# Patient Record
Sex: MALE | Race: OTHER | HISPANIC OR LATINO
[De-identification: names, ages, dates, MRNs, and addresses within clinical notes are randomized per-mention and may not be internally consistent; named-entity substitution may affect disease eponyms.]

---

## 2017-03-01 ENCOUNTER — APPOINTMENT (OUTPATIENT)
Dept: PULMONOLOGY | Facility: CLINIC | Age: 67
End: 2017-03-01

## 2017-03-08 ENCOUNTER — APPOINTMENT (OUTPATIENT)
Dept: PULMONOLOGY | Facility: CLINIC | Age: 67
End: 2017-03-08

## 2017-03-08 VITALS
OXYGEN SATURATION: 97 % | DIASTOLIC BLOOD PRESSURE: 70 MMHG | BODY MASS INDEX: 30.53 KG/M2 | WEIGHT: 190 LBS | TEMPERATURE: 98.5 F | HEIGHT: 66 IN | SYSTOLIC BLOOD PRESSURE: 120 MMHG | HEART RATE: 77 BPM

## 2017-08-29 ENCOUNTER — LABORATORY RESULT (OUTPATIENT)
Age: 67
End: 2017-08-29

## 2017-08-29 ENCOUNTER — APPOINTMENT (OUTPATIENT)
Dept: INTERNAL MEDICINE | Facility: CLINIC | Age: 67
End: 2017-08-29
Payer: COMMERCIAL

## 2017-08-29 VITALS
OXYGEN SATURATION: 98 % | HEART RATE: 74 BPM | SYSTOLIC BLOOD PRESSURE: 108 MMHG | HEIGHT: 66 IN | BODY MASS INDEX: 37.61 KG/M2 | DIASTOLIC BLOOD PRESSURE: 62 MMHG | WEIGHT: 234 LBS | TEMPERATURE: 98.8 F

## 2017-08-29 DIAGNOSIS — M66.0 RUPTURE OF POPLITEAL CYST: ICD-10-CM

## 2017-08-29 DIAGNOSIS — M1A.09X0 IDIOPATHIC CHRONIC GOUT, MULTIPLE SITES, W/OUT TOPHUS (TOPHI): ICD-10-CM

## 2017-08-29 DIAGNOSIS — E66.9 OBESITY, UNSPECIFIED: ICD-10-CM

## 2017-08-29 DIAGNOSIS — N20.0 CALCULUS OF KIDNEY: ICD-10-CM

## 2017-08-29 DIAGNOSIS — J98.11 ATELECTASIS: ICD-10-CM

## 2017-08-29 DIAGNOSIS — Z09 ENCOUNTER FOR FOLLOW-UP EXAMINATION AFTER COMPLETED TREATMENT FOR CONDITIONS OTHER THAN MALIGNANT NEOPLASM: ICD-10-CM

## 2017-08-29 DIAGNOSIS — Z86.2 PERSONAL HISTORY OF DISEASES OF THE BLOOD AND BLOOD-FORMING ORGANS AND CERTAIN DISORDERS INVOLVING THE IMMUNE MECHANISM: ICD-10-CM

## 2017-08-29 PROCEDURE — 99211 OFF/OP EST MAY X REQ PHY/QHP: CPT | Mod: 25

## 2017-08-29 PROCEDURE — 36415 COLL VENOUS BLD VENIPUNCTURE: CPT

## 2017-08-29 PROCEDURE — 99397 PER PM REEVAL EST PAT 65+ YR: CPT | Mod: 25

## 2017-08-29 RX ORDER — ASPIRIN 81 MG
81 TABLET,CHEWABLE ORAL
Refills: 0 | Status: ACTIVE | COMMUNITY
Start: 2017-08-29

## 2017-08-30 ENCOUNTER — MESSAGE (OUTPATIENT)
Age: 67
End: 2017-08-30

## 2017-08-30 LAB
25(OH)D3 SERPL-MCNC: 51.7 NG/ML
ALBUMIN SERPL ELPH-MCNC: 4.4 G/DL
ALP BLD-CCNC: 115 U/L
ALT SERPL-CCNC: 5 U/L
ANION GAP SERPL CALC-SCNC: 19 MMOL/L
APPEARANCE: ABNORMAL
AST SERPL-CCNC: 21 U/L
BACTERIA: NEGATIVE
BASOPHILS # BLD AUTO: 0.05 K/UL
BASOPHILS NFR BLD AUTO: 0.4 %
BILIRUB SERPL-MCNC: 0.5 MG/DL
BILIRUBIN URINE: NEGATIVE
BLOOD URINE: ABNORMAL
BUN SERPL-MCNC: 39 MG/DL
CALCIUM SERPL-MCNC: 10.8 MG/DL
CHLORIDE SERPL-SCNC: 101 MMOL/L
CHOLEST SERPL-MCNC: 109 MG/DL
CHOLEST/HDLC SERPL: 2.2 RATIO
CO2 SERPL-SCNC: 21 MMOL/L
COLOR: ABNORMAL
CREAT SERPL-MCNC: 1.79 MG/DL
CREAT SPEC-SCNC: 183 MG/DL
EOSINOPHIL # BLD AUTO: 0.31 K/UL
EOSINOPHIL NFR BLD AUTO: 2.3 %
GLUCOSE QUALITATIVE U: NORMAL MG/DL
GLUCOSE SERPL-MCNC: 83 MG/DL
HBA1C MFR BLD HPLC: 5.5 %
HCT VFR BLD CALC: 37.3 %
HCV AB SER QL: NONREACTIVE
HCV S/CO RATIO: 0.09 S/CO
HDLC SERPL-MCNC: 49 MG/DL
HGB BLD-MCNC: 10.7 G/DL
HYALINE CASTS: 2 /LPF
IMM GRANULOCYTES NFR BLD AUTO: 0.4 %
KETONES URINE: NEGATIVE
LDLC SERPL CALC-MCNC: 49 MG/DL
LEUKOCYTE ESTERASE URINE: ABNORMAL
LYMPHOCYTES # BLD AUTO: 1.74 K/UL
LYMPHOCYTES NFR BLD AUTO: 12.8 %
MAN DIFF?: NORMAL
MCHC RBC-ENTMCNC: 21.7 PG
MCHC RBC-ENTMCNC: 28.7 GM/DL
MCV RBC AUTO: 75.5 FL
MICROALBUMIN 24H UR DL<=1MG/L-MCNC: 3.3 MG/DL
MICROALBUMIN/CREAT 24H UR-RTO: 18 MG/G
MICROSCOPIC-UA: NORMAL
MONOCYTES # BLD AUTO: 0.53 K/UL
MONOCYTES NFR BLD AUTO: 3.9 %
NEUTROPHILS # BLD AUTO: 10.91 K/UL
NEUTROPHILS NFR BLD AUTO: 80.2 %
NITRITE URINE: NEGATIVE
PH URINE: 5
PLATELET # BLD AUTO: 417 K/UL
POTASSIUM SERPL-SCNC: 4.5 MMOL/L
PROT SERPL-MCNC: 8.2 G/DL
PROTEIN URINE: NEGATIVE MG/DL
RBC # BLD: 4.94 M/UL
RBC # FLD: 25.6 %
RED BLOOD CELLS URINE: 250 /HPF
SODIUM SERPL-SCNC: 141 MMOL/L
SPECIFIC GRAVITY URINE: 1.02
SQUAMOUS EPITHELIAL CELLS: 0 /HPF
TRIGL SERPL-MCNC: 53 MG/DL
TSH SERPL-ACNC: 6.03 UIU/ML
URINE COMMENTS: NORMAL
UROBILINOGEN URINE: 1 MG/DL
WBC # FLD AUTO: 13.59 K/UL
WHITE BLOOD CELLS URINE: 124 /HPF

## 2017-08-31 ENCOUNTER — MOBILE ON CALL (OUTPATIENT)
Age: 67
End: 2017-08-31

## 2017-09-07 ENCOUNTER — TRANSCRIPTION ENCOUNTER (OUTPATIENT)
Age: 67
End: 2017-09-07

## 2017-09-11 ENCOUNTER — MESSAGE (OUTPATIENT)
Age: 67
End: 2017-09-11

## 2017-09-13 ENCOUNTER — MESSAGE (OUTPATIENT)
Age: 67
End: 2017-09-13

## 2017-11-27 ENCOUNTER — TRANSCRIPTION ENCOUNTER (OUTPATIENT)
Age: 67
End: 2017-11-27

## 2017-11-27 ENCOUNTER — MEDICATION RENEWAL (OUTPATIENT)
Age: 67
End: 2017-11-27

## 2018-01-10 ENCOUNTER — APPOINTMENT (OUTPATIENT)
Dept: HEART AND VASCULAR | Facility: CLINIC | Age: 68
End: 2018-01-10

## 2018-02-12 ENCOUNTER — APPOINTMENT (OUTPATIENT)
Dept: PULMONOLOGY | Facility: CLINIC | Age: 68
End: 2018-02-12
Payer: COMMERCIAL

## 2018-02-12 VITALS
DIASTOLIC BLOOD PRESSURE: 80 MMHG | BODY MASS INDEX: 37.61 KG/M2 | WEIGHT: 234 LBS | HEART RATE: 66 BPM | TEMPERATURE: 99.2 F | HEIGHT: 66 IN | SYSTOLIC BLOOD PRESSURE: 140 MMHG | OXYGEN SATURATION: 96 %

## 2018-02-12 PROCEDURE — 99215 OFFICE O/P EST HI 40 MIN: CPT | Mod: 25

## 2018-02-12 PROCEDURE — 71046 X-RAY EXAM CHEST 2 VIEWS: CPT

## 2018-02-12 PROCEDURE — 94010 BREATHING CAPACITY TEST: CPT

## 2018-03-02 ENCOUNTER — APPOINTMENT (OUTPATIENT)
Dept: INTERNAL MEDICINE | Facility: CLINIC | Age: 68
End: 2018-03-02

## 2018-03-30 ENCOUNTER — APPOINTMENT (OUTPATIENT)
Dept: INTERNAL MEDICINE | Facility: CLINIC | Age: 68
End: 2018-03-30
Payer: COMMERCIAL

## 2018-03-30 ENCOUNTER — LABORATORY RESULT (OUTPATIENT)
Age: 68
End: 2018-03-30

## 2018-03-30 VITALS
DIASTOLIC BLOOD PRESSURE: 77 MMHG | HEART RATE: 68 BPM | SYSTOLIC BLOOD PRESSURE: 108 MMHG | OXYGEN SATURATION: 98 % | HEIGHT: 66 IN | TEMPERATURE: 98.3 F | WEIGHT: 260 LBS | BODY MASS INDEX: 41.78 KG/M2

## 2018-03-30 DIAGNOSIS — M48.061 SPINAL STENOSIS, LUMBAR REGION WITHOUT NEUROGENIC CLAUDICATION: ICD-10-CM

## 2018-03-30 DIAGNOSIS — J44.9 CHRONIC OBSTRUCTIVE PULMONARY DISEASE, UNSPECIFIED: ICD-10-CM

## 2018-03-30 PROCEDURE — 99214 OFFICE O/P EST MOD 30 MIN: CPT | Mod: 25

## 2018-03-30 PROCEDURE — 36415 COLL VENOUS BLD VENIPUNCTURE: CPT

## 2018-04-03 ENCOUNTER — TRANSCRIPTION ENCOUNTER (OUTPATIENT)
Age: 68
End: 2018-04-03

## 2018-04-03 LAB
25(OH)D3 SERPL-MCNC: 45 NG/ML
ALBUMIN SERPL ELPH-MCNC: 3.7 G/DL
ALP BLD-CCNC: 89 U/L
ALT SERPL-CCNC: 9 U/L
ANION GAP SERPL CALC-SCNC: 18 MMOL/L
AST SERPL-CCNC: 24 U/L
BASOPHILS # BLD AUTO: 0 K/UL
BASOPHILS NFR BLD AUTO: 0 %
BILIRUB SERPL-MCNC: 0.5 MG/DL
BUN SERPL-MCNC: 37 MG/DL
CALCIUM SERPL-MCNC: 10 MG/DL
CHLORIDE SERPL-SCNC: 107 MMOL/L
CHOLEST SERPL-MCNC: 92 MG/DL
CHOLEST/HDLC SERPL: 2.5 RATIO
CO2 SERPL-SCNC: 21 MMOL/L
CREAT SERPL-MCNC: 1.61 MG/DL
EOSINOPHIL # BLD AUTO: 0.56 K/UL
EOSINOPHIL NFR BLD AUTO: 4.5 %
FERRITIN SERPL-MCNC: 21 NG/ML
FOLATE SERPL-MCNC: >20 NG/ML
GLUCOSE SERPL-MCNC: 102 MG/DL
HBA1C MFR BLD HPLC: 5.8 %
HCT VFR BLD CALC: 33.8 %
HDLC SERPL-MCNC: 37 MG/DL
HGB BLD-MCNC: 9.5 G/DL
IRON SATN MFR SERPL: 6 %
IRON SERPL-MCNC: 18 UG/DL
LDLC SERPL CALC-MCNC: 43 MG/DL
LYMPHOCYTES # BLD AUTO: 0.34 K/UL
LYMPHOCYTES NFR BLD AUTO: 2.7 %
MAN DIFF?: NORMAL
MCHC RBC-ENTMCNC: 20 PG
MCHC RBC-ENTMCNC: 28.1 GM/DL
MCV RBC AUTO: 71 FL
MONOCYTES # BLD AUTO: 0.34 K/UL
MONOCYTES NFR BLD AUTO: 2.7 %
NEUTROPHILS # BLD AUTO: 11.2 K/UL
NEUTROPHILS NFR BLD AUTO: 90.1 %
PLATELET # BLD AUTO: 380 K/UL
POTASSIUM SERPL-SCNC: 4.6 MMOL/L
PROT SERPL-MCNC: 7.1 G/DL
RBC # BLD: 4.76 M/UL
RBC # FLD: 23 %
SODIUM SERPL-SCNC: 146 MMOL/L
T3FREE SERPL-MCNC: 2.63 PG/ML
T4 FREE SERPL-MCNC: 1.1 NG/DL
TIBC SERPL-MCNC: 324 UG/DL
TRIGL SERPL-MCNC: 62 MG/DL
TSH SERPL-ACNC: 6.3 UIU/ML
UIBC SERPL-MCNC: 306 UG/DL
VIT B12 SERPL-MCNC: 1333 PG/ML
WBC # FLD AUTO: 12.43 K/UL

## 2018-04-09 ENCOUNTER — RX RENEWAL (OUTPATIENT)
Age: 68
End: 2018-04-09

## 2018-04-09 ENCOUNTER — TRANSCRIPTION ENCOUNTER (OUTPATIENT)
Age: 68
End: 2018-04-09

## 2018-06-12 ENCOUNTER — RX RENEWAL (OUTPATIENT)
Age: 68
End: 2018-06-12

## 2018-06-25 ENCOUNTER — RX RENEWAL (OUTPATIENT)
Age: 68
End: 2018-06-25

## 2018-06-25 ENCOUNTER — TRANSCRIPTION ENCOUNTER (OUTPATIENT)
Age: 68
End: 2018-06-25

## 2018-07-18 ENCOUNTER — LABORATORY RESULT (OUTPATIENT)
Age: 68
End: 2018-07-18

## 2018-07-18 ENCOUNTER — APPOINTMENT (OUTPATIENT)
Dept: DERMATOLOGY | Facility: CLINIC | Age: 68
End: 2018-07-18
Payer: COMMERCIAL

## 2018-07-18 VITALS — SYSTOLIC BLOOD PRESSURE: 98 MMHG | DIASTOLIC BLOOD PRESSURE: 68 MMHG

## 2018-07-18 DIAGNOSIS — E61.1 IRON DEFICIENCY: ICD-10-CM

## 2018-07-18 DIAGNOSIS — L29.9 PRURITUS, UNSPECIFIED: ICD-10-CM

## 2018-07-18 PROCEDURE — 99203 OFFICE O/P NEW LOW 30 MIN: CPT

## 2018-07-18 PROCEDURE — 36415 COLL VENOUS BLD VENIPUNCTURE: CPT

## 2018-07-19 ENCOUNTER — MOBILE ON CALL (OUTPATIENT)
Age: 68
End: 2018-07-19

## 2018-07-23 LAB
ALBUMIN SERPL ELPH-MCNC: 4.3 G/DL
ALP BLD-CCNC: 86 U/L
ALT SERPL-CCNC: 7 U/L
ANION GAP SERPL CALC-SCNC: 17 MMOL/L
AST SERPL-CCNC: 19 U/L
BILIRUB SERPL-MCNC: 0.4 MG/DL
BUN SERPL-MCNC: 21 MG/DL
CALCIUM SERPL-MCNC: 10.1 MG/DL
CHLORIDE SERPL-SCNC: 102 MMOL/L
CO2 SERPL-SCNC: 24 MMOL/L
CREAT SERPL-MCNC: 1.39 MG/DL
GLUCOSE SERPL-MCNC: 144 MG/DL
POTASSIUM SERPL-SCNC: 4.5 MMOL/L
PROT SERPL-MCNC: 7.6 G/DL
SODIUM SERPL-SCNC: 143 MMOL/L
TRANSFERRIN SERPL-MCNC: 321 MG/DL
TSH SERPL-ACNC: 5.92 UIU/ML

## 2018-07-30 LAB
BASOPHILS # BLD AUTO: 0.03 K/UL
BASOPHILS NFR BLD AUTO: 0.2 %
EOSINOPHIL # BLD AUTO: 0.07 K/UL
EOSINOPHIL NFR BLD AUTO: 0.5 %
FERRITIN SERPL-MCNC: 13 NG/ML
HCT VFR BLD CALC: 38 %
HGB BLD-MCNC: 10.5 G/DL
IMM GRANULOCYTES NFR BLD AUTO: 0.2 %
IRON SERPL-MCNC: 14 UG/DL
LYMPHOCYTES # BLD AUTO: 0.67 K/UL
LYMPHOCYTES NFR BLD AUTO: 4.7 %
MAN DIFF?: NORMAL
MCHC RBC-ENTMCNC: 19.9 PG
MCHC RBC-ENTMCNC: 27.6 GM/DL
MCV RBC AUTO: 72.1 FL
MONOCYTES # BLD AUTO: 0.55 K/UL
MONOCYTES NFR BLD AUTO: 3.8 %
NEUTROPHILS # BLD AUTO: 13.02 K/UL
NEUTROPHILS NFR BLD AUTO: 90.6 %
PLATELET # BLD AUTO: 460 K/UL
RBC # BLD: 5.27 M/UL
RBC # FLD: 25.7 %
WBC # FLD AUTO: 14.37 K/UL

## 2018-08-02 ENCOUNTER — OTHER (OUTPATIENT)
Age: 68
End: 2018-08-02

## 2018-08-03 ENCOUNTER — APPOINTMENT (OUTPATIENT)
Dept: FAMILY MEDICINE | Facility: CLINIC | Age: 68
End: 2018-08-03
Payer: COMMERCIAL

## 2018-08-03 VITALS
TEMPERATURE: 98.9 F | HEIGHT: 66 IN | WEIGHT: 275 LBS | SYSTOLIC BLOOD PRESSURE: 129 MMHG | DIASTOLIC BLOOD PRESSURE: 68 MMHG | HEART RATE: 116 BPM | OXYGEN SATURATION: 96 % | BODY MASS INDEX: 44.2 KG/M2

## 2018-08-03 VITALS — HEART RATE: 90 BPM

## 2018-08-03 DIAGNOSIS — D72.829 ELEVATED WHITE BLOOD CELL COUNT, UNSPECIFIED: ICD-10-CM

## 2018-08-03 PROCEDURE — 99204 OFFICE O/P NEW MOD 45 MIN: CPT

## 2018-08-03 RX ORDER — PREDNISONE 10 MG/1
10 TABLET ORAL
Qty: 90 | Refills: 3 | Status: DISCONTINUED | COMMUNITY
Start: 2017-08-02 | End: 2018-08-03

## 2018-08-03 NOTE — ASSESSMENT
[FreeTextEntry1] : Will need CPE at next visit. Follow up in 1 month. Needs PNA vaccination and DEXA.

## 2018-08-03 NOTE — PHYSICAL EXAM
[No Acute Distress] : no acute distress [Well Nourished] : well nourished [Normal Sclera/Conjunctiva] : normal sclera/conjunctiva [Normal Outer Ear/Nose] : the outer ears and nose were normal in appearance [Supple] : supple [No Respiratory Distress] : no respiratory distress  [Clear to Auscultation] : lungs were clear to auscultation bilaterally [No Accessory Muscle Use] : no accessory muscle use [Normal Rate] : normal rate  [Regular Rhythm] : with a regular rhythm [Normal S1, S2] : normal S1 and S2 [No Joint Swelling] : no joint swelling [No Rash] : no rash [Normal Gait] : normal gait [Normal Insight/Judgement] : insight and judgment were intact

## 2018-08-07 ENCOUNTER — FORM ENCOUNTER (OUTPATIENT)
Age: 68
End: 2018-08-07

## 2018-08-08 ENCOUNTER — APPOINTMENT (OUTPATIENT)
Dept: ULTRASOUND IMAGING | Facility: CLINIC | Age: 68
End: 2018-08-08
Payer: COMMERCIAL

## 2018-08-08 ENCOUNTER — OUTPATIENT (OUTPATIENT)
Dept: OUTPATIENT SERVICES | Facility: HOSPITAL | Age: 68
LOS: 1 days | End: 2018-08-08

## 2018-08-08 DIAGNOSIS — Z90.49 ACQUIRED ABSENCE OF OTHER SPECIFIED PARTS OF DIGESTIVE TRACT: Chronic | ICD-10-CM

## 2018-08-08 PROCEDURE — 76536 US EXAM OF HEAD AND NECK: CPT | Mod: 26

## 2018-08-09 ENCOUNTER — NON-APPOINTMENT (OUTPATIENT)
Age: 68
End: 2018-08-09

## 2018-08-09 ENCOUNTER — TRANSCRIPTION ENCOUNTER (OUTPATIENT)
Age: 68
End: 2018-08-09

## 2018-08-09 ENCOUNTER — APPOINTMENT (OUTPATIENT)
Dept: HEART AND VASCULAR | Facility: CLINIC | Age: 68
End: 2018-08-09
Payer: COMMERCIAL

## 2018-08-09 VITALS
WEIGHT: 275 LBS | HEIGHT: 66 IN | OXYGEN SATURATION: 97 % | DIASTOLIC BLOOD PRESSURE: 80 MMHG | HEART RATE: 70 BPM | BODY MASS INDEX: 44.2 KG/M2 | SYSTOLIC BLOOD PRESSURE: 140 MMHG

## 2018-08-09 DIAGNOSIS — I27.20 PULMONARY HYPERTENSION, UNSPECIFIED: ICD-10-CM

## 2018-08-09 PROCEDURE — 99214 OFFICE O/P EST MOD 30 MIN: CPT

## 2018-08-09 PROCEDURE — 93000 ELECTROCARDIOGRAM COMPLETE: CPT

## 2018-08-13 ENCOUNTER — TRANSCRIPTION ENCOUNTER (OUTPATIENT)
Age: 68
End: 2018-08-13

## 2018-08-14 ENCOUNTER — OTHER (OUTPATIENT)
Age: 68
End: 2018-08-14

## 2018-08-15 ENCOUNTER — OUTPATIENT (OUTPATIENT)
Dept: OUTPATIENT SERVICES | Facility: HOSPITAL | Age: 68
LOS: 1 days | Discharge: HOME | End: 2018-08-15
Payer: SELF-PAY

## 2018-08-15 ENCOUNTER — APPOINTMENT (OUTPATIENT)
Dept: GASTROENTEROLOGY | Facility: HOSPITAL | Age: 68
End: 2018-08-15

## 2018-08-15 ENCOUNTER — RESULT REVIEW (OUTPATIENT)
Age: 68
End: 2018-08-15

## 2018-08-15 DIAGNOSIS — Z90.49 ACQUIRED ABSENCE OF OTHER SPECIFIED PARTS OF DIGESTIVE TRACT: Chronic | ICD-10-CM

## 2018-08-15 PROBLEM — K29.70 GASTRITIS: Status: ACTIVE | Noted: 2018-08-15

## 2018-08-15 PROCEDURE — 45380 COLONOSCOPY AND BIOPSY: CPT

## 2018-08-15 PROCEDURE — 88305 TISSUE EXAM BY PATHOLOGIST: CPT

## 2018-08-15 PROCEDURE — 45385 COLONOSCOPY W/LESION REMOVAL: CPT

## 2018-08-15 PROCEDURE — 43239 EGD BIOPSY SINGLE/MULTIPLE: CPT

## 2018-08-15 RX ORDER — OMEPRAZOLE 40 MG/1
40 CAPSULE, DELAYED RELEASE ORAL
Qty: 30 | Refills: 0 | Status: ACTIVE | COMMUNITY
Start: 2018-08-15 | End: 1900-01-01

## 2018-08-16 LAB — SURGICAL PATHOLOGY STUDY: SIGNIFICANT CHANGE UP

## 2018-08-20 ENCOUNTER — RESULT REVIEW (OUTPATIENT)
Age: 68
End: 2018-08-20

## 2018-08-21 ENCOUNTER — APPOINTMENT (OUTPATIENT)
Dept: HEART AND VASCULAR | Facility: CLINIC | Age: 68
End: 2018-08-21
Payer: COMMERCIAL

## 2018-08-21 DIAGNOSIS — I25.10 ATHEROSCLEROTIC HEART DISEASE OF NATIVE CORONARY ARTERY W/OUT ANGINA PECTORIS: ICD-10-CM

## 2018-08-21 DIAGNOSIS — K29.70 GASTRITIS, UNSPECIFIED, W/OUT BLEEDING: ICD-10-CM

## 2018-08-21 DIAGNOSIS — I65.29 OCCLUSION AND STENOSIS OF UNSPECIFIED CAROTID ARTERY: ICD-10-CM

## 2018-08-21 PROCEDURE — 93306 TTE W/DOPPLER COMPLETE: CPT

## 2018-08-21 PROCEDURE — 93880 EXTRACRANIAL BILAT STUDY: CPT

## 2018-08-23 ENCOUNTER — APPOINTMENT (OUTPATIENT)
Dept: HEART AND VASCULAR | Facility: CLINIC | Age: 68
End: 2018-08-23
Payer: COMMERCIAL

## 2018-08-23 VITALS
HEIGHT: 66 IN | BODY MASS INDEX: 44.2 KG/M2 | DIASTOLIC BLOOD PRESSURE: 68 MMHG | HEART RATE: 74 BPM | WEIGHT: 275 LBS | OXYGEN SATURATION: 97 % | SYSTOLIC BLOOD PRESSURE: 124 MMHG

## 2018-08-23 DIAGNOSIS — I10 ESSENTIAL (PRIMARY) HYPERTENSION: ICD-10-CM

## 2018-08-23 DIAGNOSIS — I50.22 CHRONIC SYSTOLIC (CONGESTIVE) HEART FAILURE: ICD-10-CM

## 2018-08-23 PROCEDURE — 99214 OFFICE O/P EST MOD 30 MIN: CPT

## 2018-08-24 PROBLEM — I50.22 CHRONIC SYSTOLIC CHF (CONGESTIVE HEART FAILURE), NYHA CLASS 1: Status: ACTIVE | Noted: 2017-08-29

## 2018-08-24 PROBLEM — I25.10 CAD (CORONARY ARTERY DISEASE): Status: ACTIVE | Noted: 2017-08-29

## 2018-08-24 PROBLEM — I65.29 CAROTID ATHEROSCLEROSIS: Status: ACTIVE | Noted: 2018-08-24

## 2018-08-26 ENCOUNTER — MOBILE ON CALL (OUTPATIENT)
Age: 68
End: 2018-08-26

## 2018-08-27 ENCOUNTER — TRANSCRIPTION ENCOUNTER (OUTPATIENT)
Age: 68
End: 2018-08-27

## 2018-08-29 ENCOUNTER — LABORATORY RESULT (OUTPATIENT)
Age: 68
End: 2018-08-29

## 2018-08-29 ENCOUNTER — APPOINTMENT (OUTPATIENT)
Dept: FAMILY MEDICINE | Facility: CLINIC | Age: 68
End: 2018-08-29
Payer: COMMERCIAL

## 2018-08-29 VITALS
DIASTOLIC BLOOD PRESSURE: 77 MMHG | OXYGEN SATURATION: 96 % | WEIGHT: 269 LBS | BODY MASS INDEX: 43.23 KG/M2 | TEMPERATURE: 97.8 F | HEIGHT: 66 IN | SYSTOLIC BLOOD PRESSURE: 124 MMHG

## 2018-08-29 DIAGNOSIS — R79.89 OTHER SPECIFIED ABNORMAL FINDINGS OF BLOOD CHEMISTRY: ICD-10-CM

## 2018-08-29 DIAGNOSIS — Z23 ENCOUNTER FOR IMMUNIZATION: ICD-10-CM

## 2018-08-29 DIAGNOSIS — Z13.21 ENCOUNTER FOR SCREENING FOR NUTRITIONAL DISORDER: ICD-10-CM

## 2018-08-29 PROCEDURE — 36415 COLL VENOUS BLD VENIPUNCTURE: CPT

## 2018-08-29 PROCEDURE — 99387 INIT PM E/M NEW PAT 65+ YRS: CPT | Mod: 25

## 2018-08-29 NOTE — HISTORY OF PRESENT ILLNESS
[de-identified] : 66 yo male here for CPE and follow up blood work. Patient feeling well today, no complaints. He saw Cardio who did EKG and echo recently. Patient is going to follow up with Yesi [facility]inn for evaluation of possible sleep apnea and sarcoidosis. Also has to schedule a nuclear stress test with cardio. Patient has appointment with endocrinology coming up as well for elevated TSH. May need Synthroid pending stress test.

## 2018-08-29 NOTE — PHYSICAL EXAM
[No Acute Distress] : no acute distress [Well Nourished] : well nourished [Well Developed] : well developed [Well-Appearing] : well-appearing [Normal Sclera/Conjunctiva] : normal sclera/conjunctiva [PERRL] : pupils equal round and reactive to light [EOMI] : extraocular movements intact [Normal Outer Ear/Nose] : the outer ears and nose were normal in appearance [Normal Oropharynx] : the oropharynx was normal [No JVD] : no jugular venous distention [Supple] : supple [No Lymphadenopathy] : no lymphadenopathy [Thyroid Normal, No Nodules] : the thyroid was normal and there were no nodules present [No Respiratory Distress] : no respiratory distress  [Clear to Auscultation] : lungs were clear to auscultation bilaterally [No Accessory Muscle Use] : no accessory muscle use [Normal Rate] : normal rate  [Regular Rhythm] : with a regular rhythm [Normal S1, S2] : normal S1 and S2 [No Murmur] : no murmur heard [No Abdominal Bruit] : a ~M bruit was not heard ~T in the abdomen [No Varicosities] : no varicosities [No Edema] : there was no peripheral edema [No Extremity Clubbing/Cyanosis] : no extremity clubbing/cyanosis [No Palpable Aorta] : no palpable aorta [Soft] : abdomen soft [Non Tender] : non-tender [Non-distended] : non-distended [No Masses] : no abdominal mass palpated [No HSM] : no HSM [Normal Bowel Sounds] : normal bowel sounds [Normal Posterior Cervical Nodes] : no posterior cervical lymphadenopathy [Normal Anterior Cervical Nodes] : no anterior cervical lymphadenopathy [No CVA Tenderness] : no CVA  tenderness [No Spinal Tenderness] : no spinal tenderness [No Joint Swelling] : no joint swelling [Grossly Normal Strength/Tone] : grossly normal strength/tone [No Rash] : no rash [Normal Gait] : normal gait [Coordination Grossly Intact] : coordination grossly intact [No Focal Deficits] : no focal deficits [Normal Affect] : the affect was normal [Normal Insight/Judgement] : insight and judgment were intact

## 2018-08-29 NOTE — ASSESSMENT
[FreeTextEntry1] : CPE today- Well exam, comprehensive labs ordered. EKG and echo done recently with cardio, pending stress test.  Up to date on colonoscopy. Will send for DEXA given long term steroid use. Completed prevnar/pneumovax series.

## 2018-08-29 NOTE — HEALTH RISK ASSESSMENT
[Good] : ~his/her~  mood as  good [Patient reported colonoscopy was normal] : Patient reported colonoscopy was normal [With Significant Other] : lives with significant other [] :  [ColonoscopyDate] : 08/17

## 2018-08-30 ENCOUNTER — APPOINTMENT (OUTPATIENT)
Dept: PULMONOLOGY | Facility: CLINIC | Age: 68
End: 2018-08-30

## 2018-08-30 ENCOUNTER — TRANSCRIPTION ENCOUNTER (OUTPATIENT)
Age: 68
End: 2018-08-30

## 2018-08-30 PROBLEM — R79.89 ELEVATED TSH: Status: ACTIVE | Noted: 2018-08-30

## 2018-08-30 LAB
25(OH)D3 SERPL-MCNC: 44.2 NG/ML
ALBUMIN SERPL ELPH-MCNC: 4.3 G/DL
ALP BLD-CCNC: 97 U/L
ALT SERPL-CCNC: 13 U/L
ANION GAP SERPL CALC-SCNC: 36 MMOL/L
AST SERPL-CCNC: 21 U/L
BASOPHILS # BLD AUTO: 0.03 K/UL
BASOPHILS NFR BLD AUTO: 0.3 %
BILIRUB SERPL-MCNC: 0.7 MG/DL
BUN SERPL-MCNC: 20 MG/DL
CALCIUM SERPL-MCNC: 10.5 MG/DL
CHLORIDE SERPL-SCNC: 94 MMOL/L
CHOLEST SERPL-MCNC: 165 MG/DL
CHOLEST/HDLC SERPL: 3.8 RATIO
CO2 SERPL-SCNC: 19 MMOL/L
CREAT SERPL-MCNC: 1.73 MG/DL
EOSINOPHIL # BLD AUTO: 0.15 K/UL
EOSINOPHIL NFR BLD AUTO: 1.3 %
FOLATE SERPL-MCNC: >20 NG/ML
GLUCOSE SERPL-MCNC: NORMAL MG/DL
HBA1C MFR BLD HPLC: 6 %
HCT VFR BLD CALC: 43.5 %
HDLC SERPL-MCNC: 43 MG/DL
HGB BLD-MCNC: 11.3 G/DL
IMM GRANULOCYTES NFR BLD AUTO: 0.3 %
LDLC SERPL CALC-MCNC: 95 MG/DL
LYMPHOCYTES # BLD AUTO: 0.49 K/UL
LYMPHOCYTES NFR BLD AUTO: 4.3 %
MAN DIFF?: NORMAL
MCHC RBC-ENTMCNC: 20.5 PG
MCHC RBC-ENTMCNC: 26 GM/DL
MCV RBC AUTO: 78.8 FL
MONOCYTES # BLD AUTO: 0.45 K/UL
MONOCYTES NFR BLD AUTO: 3.9 %
NEUTROPHILS # BLD AUTO: 10.34 K/UL
NEUTROPHILS NFR BLD AUTO: 89.9 %
PLATELET # BLD AUTO: 428 K/UL
POTASSIUM SERPL-SCNC: 5.9 MMOL/L
PROT SERPL-MCNC: 8.1 G/DL
RBC # BLD: 5.52 M/UL
RBC # FLD: 25.7 %
SODIUM SERPL-SCNC: 149 MMOL/L
T3 SERPL-MCNC: 137 NG/DL
T4 FREE SERPL-MCNC: 1.3 NG/DL
TRIGL SERPL-MCNC: 136 MG/DL
TSH SERPL-ACNC: 8.9 UIU/ML
VIT B12 SERPL-MCNC: 1609 PG/ML
WBC # FLD AUTO: 11.49 K/UL

## 2018-08-31 LAB
THYROGLOB AB SERPL-ACNC: <20 IU/ML
THYROGLOB SERPL-MCNC: 122 NG/ML
THYROPEROXIDASE AB SERPL IA-ACNC: <10 IU/ML

## 2018-09-04 ENCOUNTER — APPOINTMENT (OUTPATIENT)
Dept: INTERNAL MEDICINE | Facility: CLINIC | Age: 68
End: 2018-09-04

## 2018-09-04 LAB
T4BG SERPL-MCNC: 19 UG/ML
TSH RECEPTOR AB: <0.5 IU/L

## 2018-09-18 ENCOUNTER — APPOINTMENT (OUTPATIENT)
Dept: ENDOCRINOLOGY | Facility: CLINIC | Age: 68
End: 2018-09-18
Payer: COMMERCIAL

## 2018-09-18 VITALS
HEIGHT: 66 IN | DIASTOLIC BLOOD PRESSURE: 84 MMHG | HEART RATE: 78 BPM | SYSTOLIC BLOOD PRESSURE: 150 MMHG | WEIGHT: 270 LBS | BODY MASS INDEX: 43.39 KG/M2

## 2018-09-18 DIAGNOSIS — R73.09 OTHER ABNORMAL GLUCOSE: ICD-10-CM

## 2018-09-18 DIAGNOSIS — E03.9 HYPOTHYROIDISM, UNSPECIFIED: ICD-10-CM

## 2018-09-18 PROCEDURE — 99205 OFFICE O/P NEW HI 60 MIN: CPT

## 2018-09-20 PROBLEM — R73.09 ELEVATED HEMOGLOBIN A1C: Status: ACTIVE | Noted: 2018-09-20

## 2018-09-20 PROBLEM — E03.9 SUBCLINICAL HYPOTHYROIDISM: Status: RESOLVED | Noted: 2018-08-03 | Resolved: 2018-09-20

## 2018-10-22 ENCOUNTER — OTHER (OUTPATIENT)
Age: 68
End: 2018-10-22

## 2018-12-14 ENCOUNTER — LABORATORY RESULT (OUTPATIENT)
Age: 68
End: 2018-12-14

## 2018-12-14 ENCOUNTER — APPOINTMENT (OUTPATIENT)
Dept: FAMILY MEDICINE | Facility: CLINIC | Age: 68
End: 2018-12-14
Payer: COMMERCIAL

## 2018-12-14 VITALS
OXYGEN SATURATION: 95 % | WEIGHT: 278 LBS | HEIGHT: 66 IN | DIASTOLIC BLOOD PRESSURE: 87 MMHG | HEART RATE: 58 BPM | TEMPERATURE: 98.2 F | BODY MASS INDEX: 44.68 KG/M2 | SYSTOLIC BLOOD PRESSURE: 141 MMHG

## 2018-12-14 VITALS — DIASTOLIC BLOOD PRESSURE: 80 MMHG | SYSTOLIC BLOOD PRESSURE: 135 MMHG

## 2018-12-14 DIAGNOSIS — R73.03 PREDIABETES.: ICD-10-CM

## 2018-12-14 DIAGNOSIS — N18.9 CHRONIC KIDNEY DISEASE, UNSPECIFIED: ICD-10-CM

## 2018-12-14 DIAGNOSIS — E03.9 HYPOTHYROIDISM, UNSPECIFIED: ICD-10-CM

## 2018-12-14 DIAGNOSIS — I48.91 UNSPECIFIED ATRIAL FIBRILLATION: ICD-10-CM

## 2018-12-14 DIAGNOSIS — Z00.00 ENCOUNTER FOR GENERAL ADULT MEDICAL EXAMINATION W/OUT ABNORMAL FINDINGS: ICD-10-CM

## 2018-12-14 PROCEDURE — 99214 OFFICE O/P EST MOD 30 MIN: CPT | Mod: 25

## 2018-12-14 PROCEDURE — 36415 COLL VENOUS BLD VENIPUNCTURE: CPT

## 2018-12-14 NOTE — PHYSICAL EXAM
[No Acute Distress] : no acute distress [Well Nourished] : well nourished [Normal Sclera/Conjunctiva] : normal sclera/conjunctiva [Normal Outer Ear/Nose] : the outer ears and nose were normal in appearance [Supple] : supple [No Respiratory Distress] : no respiratory distress  [Clear to Auscultation] : lungs were clear to auscultation bilaterally [Normal Rate] : normal rate  [Regular Rhythm] : with a regular rhythm [No Edema] : there was no peripheral edema [No Joint Swelling] : no joint swelling [No Rash] : no rash [Normal Gait] : normal gait [Normal Insight/Judgement] : insight and judgment were intact

## 2018-12-20 ENCOUNTER — TRANSCRIPTION ENCOUNTER (OUTPATIENT)
Age: 68
End: 2018-12-20

## 2018-12-20 LAB
25(OH)D3 SERPL-MCNC: 36.7 NG/ML
ALBUMIN SERPL ELPH-MCNC: 4.1 G/DL
ALP BLD-CCNC: 79 U/L
ALT SERPL-CCNC: 19 U/L
ANION GAP SERPL CALC-SCNC: 14 MMOL/L
AST SERPL-CCNC: 37 U/L
BASOPHILS # BLD AUTO: 0.02 K/UL
BASOPHILS NFR BLD AUTO: 0.2 %
BILIRUB SERPL-MCNC: 0.5 MG/DL
BUN SERPL-MCNC: 24 MG/DL
CALCIUM SERPL-MCNC: 10.7 MG/DL
CHLORIDE SERPL-SCNC: 105 MMOL/L
CO2 SERPL-SCNC: 24 MMOL/L
CREAT SERPL-MCNC: 1.14 MG/DL
EOSINOPHIL # BLD AUTO: 0.03 K/UL
EOSINOPHIL NFR BLD AUTO: 0.3 %
GLUCOSE SERPL-MCNC: 125 MG/DL
HBA1C MFR BLD HPLC: 6 %
HCT VFR BLD CALC: 51.2 %
HGB BLD-MCNC: 16.4 G/DL
IMM GRANULOCYTES NFR BLD AUTO: 0.3 %
LYMPHOCYTES # BLD AUTO: 0.76 K/UL
LYMPHOCYTES NFR BLD AUTO: 6.6 %
MAN DIFF?: NORMAL
MCHC RBC-ENTMCNC: 28.9 PG
MCHC RBC-ENTMCNC: 32 GM/DL
MCV RBC AUTO: 90.1 FL
MONOCYTES # BLD AUTO: 0.34 K/UL
MONOCYTES NFR BLD AUTO: 3 %
NEUTROPHILS # BLD AUTO: 10.26 K/UL
NEUTROPHILS NFR BLD AUTO: 89.6 %
PLATELET # BLD AUTO: 264 K/UL
POTASSIUM SERPL-SCNC: 4.9 MMOL/L
PROT SERPL-MCNC: 7.6 G/DL
RBC # BLD: 5.68 M/UL
RBC # FLD: 20.3 %
SODIUM SERPL-SCNC: 143 MMOL/L
T3FREE SERPL-MCNC: 3.16 PG/ML
T4 FREE SERPL-MCNC: 1.1 NG/DL
THYROGLOB AB SERPL-ACNC: <20 IU/ML
THYROPEROXIDASE AB SERPL IA-ACNC: <10 IU/ML
TSH SERPL-ACNC: 5.83 UIU/ML
WBC # FLD AUTO: 11.44 K/UL

## 2019-01-09 ENCOUNTER — TRANSCRIPTION ENCOUNTER (OUTPATIENT)
Age: 69
End: 2019-01-09

## 2019-01-09 RX ORDER — DIGOXIN 0.12 MG/1
125 TABLET ORAL DAILY
Qty: 90 | Refills: 3 | Status: ACTIVE | COMMUNITY
Start: 2017-03-15 | End: 1900-01-01

## 2019-01-09 RX ORDER — FUROSEMIDE 80 MG/1
80 TABLET ORAL
Qty: 90 | Refills: 3 | Status: ACTIVE | COMMUNITY
Start: 2017-08-29 | End: 1900-01-01

## 2019-01-10 ENCOUNTER — TRANSCRIPTION ENCOUNTER (OUTPATIENT)
Age: 69
End: 2019-01-10

## 2019-01-10 RX ORDER — METHOTREXATE 2.5 MG/1
2.5 TABLET ORAL
Qty: 36 | Refills: 3 | Status: ACTIVE | COMMUNITY
Start: 2017-08-29 | End: 1900-01-01

## 2019-01-10 RX ORDER — FERROUS GLUCONATE 240(27)MG
240 (27 FE) TABLET ORAL DAILY
Qty: 90 | Refills: 1 | Status: ACTIVE | COMMUNITY
Start: 2018-08-03 | End: 1900-01-01

## 2019-01-10 RX ORDER — ENALAPRIL MALEATE 5 MG/1
5 TABLET ORAL DAILY
Qty: 90 | Refills: 3 | Status: ACTIVE | COMMUNITY
Start: 2017-03-13 | End: 1900-01-01

## 2019-01-10 RX ORDER — PREDNISONE 10 MG/1
10 TABLET ORAL
Qty: 45 | Refills: 3 | Status: ACTIVE | COMMUNITY
Start: 2017-08-29 | End: 1900-01-01

## 2019-01-14 ENCOUNTER — MOBILE ON CALL (OUTPATIENT)
Age: 69
End: 2019-01-14

## 2019-01-15 ENCOUNTER — APPOINTMENT (OUTPATIENT)
Dept: ENDOCRINOLOGY | Facility: CLINIC | Age: 69
End: 2019-01-15

## 2019-02-14 ENCOUNTER — TRANSCRIPTION ENCOUNTER (OUTPATIENT)
Age: 69
End: 2019-02-14

## 2019-03-20 ENCOUNTER — TRANSCRIPTION ENCOUNTER (OUTPATIENT)
Age: 69
End: 2019-03-20

## 2019-04-23 ENCOUNTER — RX RENEWAL (OUTPATIENT)
Age: 69
End: 2019-04-23

## 2019-04-23 ENCOUNTER — TRANSCRIPTION ENCOUNTER (OUTPATIENT)
Age: 69
End: 2019-04-23

## 2019-07-07 ENCOUNTER — TRANSCRIPTION ENCOUNTER (OUTPATIENT)
Age: 69
End: 2019-07-07

## 2019-07-09 ENCOUNTER — APPOINTMENT (OUTPATIENT)
Dept: FAMILY MEDICINE | Facility: CLINIC | Age: 69
End: 2019-07-09
Payer: COMMERCIAL

## 2019-07-16 ENCOUNTER — LABORATORY RESULT (OUTPATIENT)
Age: 69
End: 2019-07-16

## 2019-07-16 ENCOUNTER — NON-APPOINTMENT (OUTPATIENT)
Age: 69
End: 2019-07-16

## 2019-07-16 ENCOUNTER — APPOINTMENT (OUTPATIENT)
Dept: FAMILY MEDICINE | Facility: CLINIC | Age: 69
End: 2019-07-16
Payer: COMMERCIAL

## 2019-07-16 VITALS
HEART RATE: 97 BPM | BODY MASS INDEX: 45.32 KG/M2 | HEIGHT: 66 IN | DIASTOLIC BLOOD PRESSURE: 87 MMHG | TEMPERATURE: 97.6 F | OXYGEN SATURATION: 96 % | WEIGHT: 282 LBS | SYSTOLIC BLOOD PRESSURE: 137 MMHG

## 2019-07-16 DIAGNOSIS — D86.9 SARCOIDOSIS, UNSPECIFIED: ICD-10-CM

## 2019-07-16 DIAGNOSIS — R60.0 LOCALIZED EDEMA: ICD-10-CM

## 2019-07-16 DIAGNOSIS — R53.83 OTHER FATIGUE: ICD-10-CM

## 2019-07-16 DIAGNOSIS — N18.9 CHRONIC KIDNEY DISEASE, UNSPECIFIED: ICD-10-CM

## 2019-07-16 DIAGNOSIS — E03.9 HYPOTHYROIDISM, UNSPECIFIED: ICD-10-CM

## 2019-07-16 DIAGNOSIS — G47.00 INSOMNIA, UNSPECIFIED: ICD-10-CM

## 2019-07-16 DIAGNOSIS — R06.02 SHORTNESS OF BREATH: ICD-10-CM

## 2019-07-16 DIAGNOSIS — D63.1 CHRONIC KIDNEY DISEASE, UNSPECIFIED: ICD-10-CM

## 2019-07-16 PROCEDURE — 36415 COLL VENOUS BLD VENIPUNCTURE: CPT

## 2019-07-16 PROCEDURE — 99214 OFFICE O/P EST MOD 30 MIN: CPT | Mod: 25

## 2019-07-16 PROCEDURE — 93000 ELECTROCARDIOGRAM COMPLETE: CPT

## 2019-07-16 RX ORDER — HYDROXYZINE HYDROCHLORIDE 10 MG/1
10 TABLET ORAL
Qty: 30 | Refills: 0 | Status: DISCONTINUED | COMMUNITY
Start: 2018-07-18 | End: 2019-07-16

## 2019-07-16 RX ORDER — SODIUM BICARBONATE 650 MG/1
650 TABLET ORAL 3 TIMES DAILY
Qty: 270 | Refills: 3 | Status: DISCONTINUED | COMMUNITY
Start: 2017-08-29 | End: 2019-07-16

## 2019-07-16 RX ORDER — TRIAMCINOLONE ACETONIDE 1 MG/G
0.1 CREAM TOPICAL
Qty: 1 | Refills: 0 | Status: DISCONTINUED | COMMUNITY
Start: 2018-07-18 | End: 2019-07-16

## 2019-07-16 RX ORDER — OMEPRAZOLE 20 MG/1
20 TABLET, DELAYED RELEASE ORAL
Qty: 90 | Refills: 0 | Status: ACTIVE | COMMUNITY
Start: 2019-07-16 | End: 1900-01-01

## 2019-07-16 NOTE — HISTORY OF PRESENT ILLNESS
[de-identified] : 67 yo male here for follow up.\par \par Patient with h/o iron deficiency anemia, last colonoscopy was August and was normal. Endoscopy in August just showing gastritis. Patient with low iron and hg of 16 found on most recent labs. Has been taking iron pills and feeling well. \par \par Also with elevated TSH, which has been stable on previous labs. Improved from prior. Saw endo was diagnosed with subclinical hypothyroidism. Will repeat levels today.\par \par Patient taking xarelto for A fib. No palpitations.\par \par H/o CKD with most recent creatinine normal. Needs repeat today.\par \par Also with h/o sarcoidosis on daily steroids and methotrexate. He is being followed by Pulm for this. Has appt next week. Leukocytosis found on CBC 12/18, relatively stable for patient. \par \par Patient admitting to fatigue over the last 2 months. Also with worsening LE edema over about the same time period. Admitting to trouble sleeping. Patient stating he frequently awakens and has trouble going back to sleep. Only sleeps a few hours a night. H/o snoring but not worsening. Admitting to some SOB with exertion chronically, but no worsening. No CP.

## 2019-07-16 NOTE — REVIEW OF SYSTEMS
[Fatigue] : fatigue [Dyspnea on Exertion] : dyspnea on exertion [Negative] : Heme/Lymph [FreeTextEntry9] : leg swelling

## 2019-07-16 NOTE — PHYSICAL EXAM
[No Acute Distress] : no acute distress [Normal Sclera/Conjunctiva] : normal sclera/conjunctiva [Normal Outer Ear/Nose] : the outer ears and nose were normal in appearance [No Lymphadenopathy] : no lymphadenopathy [No Respiratory Distress] : no respiratory distress  [No Accessory Muscle Use] : no accessory muscle use [Clear to Auscultation] : lungs were clear to auscultation bilaterally [Normal Rate] : normal rate  [Normal S1, S2] : normal S1 and S2 [Grossly Normal Strength/Tone] : grossly normal strength/tone [No Rash] : no rash [Coordination Grossly Intact] : coordination grossly intact [Normal Insight/Judgement] : insight and judgment were intact [de-identified] : irregular [de-identified] : bilaterall 1+ pitting edema, R > L

## 2019-07-19 ENCOUNTER — TRANSCRIPTION ENCOUNTER (OUTPATIENT)
Age: 69
End: 2019-07-19

## 2019-07-19 LAB
25(OH)D3 SERPL-MCNC: 49.2 NG/ML
ALBUMIN SERPL ELPH-MCNC: 4 G/DL
ALP BLD-CCNC: 101 U/L
ALT SERPL-CCNC: 10 U/L
ANION GAP SERPL CALC-SCNC: 15 MMOL/L
AST SERPL-CCNC: 22 U/L
BASOPHILS # BLD AUTO: 0.06 K/UL
BASOPHILS NFR BLD AUTO: 0.5 %
BILIRUB SERPL-MCNC: 1.7 MG/DL
BUN SERPL-MCNC: 31 MG/DL
CALCIUM SERPL-MCNC: 9.9 MG/DL
CHLORIDE SERPL-SCNC: 107 MMOL/L
CO2 SERPL-SCNC: 24 MMOL/L
CREAT SERPL-MCNC: 1.38 MG/DL
EOSINOPHIL # BLD AUTO: 0.06 K/UL
EOSINOPHIL NFR BLD AUTO: 0.5 %
ESTIMATED AVERAGE GLUCOSE: 131 MG/DL
FOLATE SERPL-MCNC: >20 NG/ML
GLUCOSE SERPL-MCNC: 101 MG/DL
HBA1C MFR BLD HPLC: 6.2 %
HCT VFR BLD CALC: 48.4 %
HGB BLD-MCNC: 15 G/DL
IMM GRANULOCYTES NFR BLD AUTO: 0.4 %
LYMPHOCYTES # BLD AUTO: 0.55 K/UL
LYMPHOCYTES NFR BLD AUTO: 5 %
MAN DIFF?: NORMAL
MCHC RBC-ENTMCNC: 31 GM/DL
MCHC RBC-ENTMCNC: 32.6 PG
MCV RBC AUTO: 105.2 FL
MONOCYTES # BLD AUTO: 0.49 K/UL
MONOCYTES NFR BLD AUTO: 4.4 %
NEUTROPHILS # BLD AUTO: 9.89 K/UL
NEUTROPHILS NFR BLD AUTO: 89.2 %
PLATELET # BLD AUTO: 204 K/UL
POTASSIUM SERPL-SCNC: 4.8 MMOL/L
PROT SERPL-MCNC: 6.4 G/DL
RBC # BLD: 4.6 M/UL
RBC # FLD: 18.5 %
SODIUM SERPL-SCNC: 146 MMOL/L
T3 SERPL-MCNC: 87 NG/DL
T4 FREE SERPL-MCNC: 1.2 NG/DL
TSH SERPL-ACNC: 11.9 UIU/ML
VIT B12 SERPL-MCNC: 1479 PG/ML
WBC # FLD AUTO: 11.09 K/UL

## 2019-07-30 ENCOUNTER — APPOINTMENT (OUTPATIENT)
Dept: PULMONOLOGY | Facility: CLINIC | Age: 69
End: 2019-07-30
Payer: COMMERCIAL

## 2019-07-30 VITALS
OXYGEN SATURATION: 94 % | TEMPERATURE: 99.4 F | HEIGHT: 66 IN | SYSTOLIC BLOOD PRESSURE: 120 MMHG | HEART RATE: 83 BPM | DIASTOLIC BLOOD PRESSURE: 80 MMHG

## 2019-07-30 VITALS — HEIGHT: 66 IN | WEIGHT: 315 LBS | BODY MASS INDEX: 50.62 KG/M2

## 2019-07-30 PROCEDURE — 94010 BREATHING CAPACITY TEST: CPT

## 2019-07-30 PROCEDURE — 99215 OFFICE O/P EST HI 40 MIN: CPT | Mod: 25

## 2019-07-30 PROCEDURE — 71046 X-RAY EXAM CHEST 2 VIEWS: CPT

## 2019-08-01 NOTE — DISCUSSION/SUMMARY
[FreeTextEntry1] : he must lose weight.  that is the only thing that is different and i'm sure that if he lost the weight and went back to his previous weight after he lost weight, he'sd feel fine.\par \par we spoke about diet and methods of losing weight.\par \par return in six months

## 2019-08-01 NOTE — HISTORY OF PRESENT ILLNESS
[FreeTextEntry1] : this man with hx of sarcoidosis and afib underwent a pleuridesis for a pleural effusion that was ultimatley found to be due to bleeding and inflammation,  once that was done and he lost 70lb his spirometry almost normalized and he  felt very well.  he's gained all the weight back after losing his job and is back to alison pretty much as he was, dyspneic on exertion.

## 2019-08-01 NOTE — PHYSICAL EXAM
[General Appearance - Well Developed] : well developed [Normal Appearance] : normal appearance [Well Groomed] : well groomed [General Appearance - Well Nourished] : well nourished [No Deformities] : no deformities [General Appearance - In No Acute Distress] : no acute distress [Normal Conjunctiva] : the conjunctiva exhibited no abnormalities [Eyelids - No Xanthelasma] : the eyelids demonstrated no xanthelasmas [Neck Appearance] : the appearance of the neck was normal [Neck Cervical Mass (___cm)] : no neck mass was observed [Jugular Venous Distention Increased] : there was no jugular-venous distention [Thyroid Diffuse Enlargement] : the thyroid was not enlarged [Thyroid Nodule] : there were no palpable thyroid nodules [Heart Rate And Rhythm] : heart rate and rhythm were normal [Heart Sounds] : normal S1 and S2 [Murmurs] : no murmurs present [Respiration, Rhythm And Depth] : normal respiratory rhythm and effort [Exaggerated Use Of Accessory Muscles For Inspiration] : no accessory muscle use [Auscultation Breath Sounds / Voice Sounds] : lungs were clear to auscultation bilaterally [Abdomen Soft] : soft [Abdomen Tenderness] : non-tender [Abdomen Mass (___ Cm)] : no abdominal mass palpated [Abnormal Walk] : normal gait [Gait - Sufficient For Exercise Testing] : the gait was sufficient for exercise testing [1+ Pitting] : 1+  pitting [FreeTextEntry1] : PA more swollen [FreeTextEntry2] : Feet mostly [Skin Color & Pigmentation] : normal skin color and pigmentation [] : no rash [No Venous Stasis] : no venous stasis [Skin Lesions] : no skin lesions [No Skin Ulcers] : no skin ulcer [No Xanthoma] : no  xanthoma was observed

## 2019-08-01 NOTE — REVIEW OF SYSTEMS
[Fatigue] : fatigue [Dyspnea] : dyspnea [Negative] : Psychiatric [de-identified] : no longer hypersomonlent and his wife does not notice snoring anywhere as much [FreeTextEntry8] : short of breath again

## 2019-08-01 NOTE — PROCEDURE
[FreeTextEntry1] : no effusions, but the heart size is larger\par \par restriction is back to where it was before he lost the weight

## 2019-08-05 ENCOUNTER — APPOINTMENT (OUTPATIENT)
Dept: HEART AND VASCULAR | Facility: CLINIC | Age: 69
End: 2019-08-05

## 2019-08-26 ENCOUNTER — TRANSCRIPTION ENCOUNTER (OUTPATIENT)
Age: 69
End: 2019-08-26

## 2019-08-26 RX ORDER — CARVEDILOL 3.12 MG/1
3.12 TABLET, FILM COATED ORAL TWICE DAILY
Qty: 180 | Refills: 1 | Status: ACTIVE | COMMUNITY
Start: 2017-03-13 | End: 1900-01-01

## 2019-10-04 NOTE — HISTORY OF PRESENT ILLNESS
[de-identified] : 66 yo male here for follow up.\par \par Patient with h/o iron deficiency anemia, last colonoscopy was August and was normal. Endoscopy in August just showing gastritis. Patient with low iron and hg of 11.3 found on recent labs 8/18. Has been taking iron pills and feeling well. \par \par Also with elevated TSH, which has been stable on previous labs. Improved from prior. Saw endo was diagnosed with subclinical hypothyroidism. Patient never went for ab testing, will have it drawn today.\par \par Patient taking xarelto for A fib and is asymptomatic. \par \par H/o CKD with recent creatinine 8/18 improved at 1.77. Needs repeat today.\par \par Also with h/o sarcoidosis on daily steroids and methotrexate. He is being followed by Pulm for this. Leukocytosis found on CBC 8/18, relatively stable for patient.  no

## 2019-10-16 ENCOUNTER — APPOINTMENT (OUTPATIENT)
Dept: FAMILY MEDICINE | Facility: CLINIC | Age: 69
End: 2019-10-16
Payer: COMMERCIAL

## 2019-10-16 VITALS
WEIGHT: 288 LBS | BODY MASS INDEX: 46.28 KG/M2 | HEIGHT: 66 IN | TEMPERATURE: 97.6 F | SYSTOLIC BLOOD PRESSURE: 109 MMHG | OXYGEN SATURATION: 98 % | DIASTOLIC BLOOD PRESSURE: 80 MMHG | HEART RATE: 96 BPM

## 2019-10-16 DIAGNOSIS — S81.801A UNSPECIFIED OPEN WOUND, RIGHT LOWER LEG, INITIAL ENCOUNTER: ICD-10-CM

## 2019-10-16 PROCEDURE — 99214 OFFICE O/P EST MOD 30 MIN: CPT

## 2019-10-16 RX ORDER — MUPIROCIN 20 MG/G
2 OINTMENT TOPICAL 3 TIMES DAILY
Qty: 1 | Refills: 0 | Status: ACTIVE | COMMUNITY
Start: 2019-10-16 | End: 1900-01-01

## 2019-10-16 NOTE — HISTORY OF PRESENT ILLNESS
[FreeTextEntry8] : 67 yo male here for hospital follow up. Was admitted for cellulitis at [facility] 9/29 until 10/7. Patient had a debridement in the hospital and was treated with IV abx. Has PICC line in place. Gets IV infusions 4 times daily of ampicillin/sulbactam and has follow up next week with ID, Dr Ann. Stating he did have a procedure in the hospital where they did a skin biopsy. As a result he has a new wound on the right leg that is leaking and somewhat painful. No fever, chills, or erythema of the leg. Feeling well today.

## 2019-10-16 NOTE — PHYSICAL EXAM
[No Acute Distress] : no acute distress [Normal Sclera/Conjunctiva] : normal sclera/conjunctiva [Normal Outer Ear/Nose] : the outer ears and nose were normal in appearance [Supple] : supple [No Respiratory Distress] : no respiratory distress  [No Accessory Muscle Use] : no accessory muscle use [Clear to Auscultation] : lungs were clear to auscultation bilaterally [Normal Rate] : normal rate  [Regular Rhythm] : with a regular rhythm [Normal S1, S2] : normal S1 and S2 [No Edema] : there was no peripheral edema [No Joint Swelling] : no joint swelling [Grossly Normal Strength/Tone] : grossly normal strength/tone [No Rash] : no rash [Coordination Grossly Intact] : coordination grossly intact [Normal Insight/Judgement] : insight and judgment were intact [de-identified] : multiple stage 2 ulcers on RLE, no erythema or warmth, mild tenderness

## 2019-10-22 ENCOUNTER — TRANSCRIPTION ENCOUNTER (OUTPATIENT)
Age: 69
End: 2019-10-22

## 2019-10-29 ENCOUNTER — APPOINTMENT (OUTPATIENT)
Dept: VASCULAR SURGERY | Facility: CLINIC | Age: 69
End: 2019-10-29